# Patient Record
Sex: MALE | Race: WHITE | NOT HISPANIC OR LATINO | URBAN - METROPOLITAN AREA
[De-identification: names, ages, dates, MRNs, and addresses within clinical notes are randomized per-mention and may not be internally consistent; named-entity substitution may affect disease eponyms.]

---

## 2021-03-22 ENCOUNTER — TELEPHONE ENCOUNTER (OUTPATIENT)
Dept: URBAN - METROPOLITAN AREA CLINIC 68 | Facility: CLINIC | Age: 71
End: 2021-03-22

## 2021-03-22 ENCOUNTER — OFFICE VISIT (OUTPATIENT)
Dept: URBAN - METROPOLITAN AREA CLINIC 66 | Facility: CLINIC | Age: 71
End: 2021-03-22

## 2021-03-22 ENCOUNTER — OFFICE VISIT (OUTPATIENT)
Dept: URBAN - METROPOLITAN AREA CLINIC 68 | Facility: CLINIC | Age: 71
End: 2021-03-22

## 2021-03-29 ENCOUNTER — OFFICE VISIT (OUTPATIENT)
Dept: URBAN - METROPOLITAN AREA CLINIC 66 | Facility: CLINIC | Age: 71
End: 2021-03-29

## 2021-06-22 ENCOUNTER — OFFICE VISIT (OUTPATIENT)
Dept: URBAN - METROPOLITAN AREA CLINIC 68 | Facility: CLINIC | Age: 71
End: 2021-06-22

## 2021-06-27 ENCOUNTER — OFFICE VISIT (OUTPATIENT)
Dept: URBAN - METROPOLITAN AREA CLINIC 68 | Facility: CLINIC | Age: 71
End: 2021-06-27

## 2021-06-28 ENCOUNTER — TELEPHONE ENCOUNTER (OUTPATIENT)
Dept: URBAN - METROPOLITAN AREA CLINIC 68 | Facility: CLINIC | Age: 71
End: 2021-06-28

## 2021-07-21 ENCOUNTER — OFFICE VISIT (OUTPATIENT)
Dept: URBAN - METROPOLITAN AREA SURGERY CENTER 12 | Facility: SURGERY CENTER | Age: 71
End: 2021-07-21

## 2021-07-22 ENCOUNTER — OFFICE VISIT (OUTPATIENT)
Dept: URBAN - METROPOLITAN AREA SURGERY CENTER 12 | Facility: SURGERY CENTER | Age: 71
End: 2021-07-22

## 2021-07-23 ENCOUNTER — LAB OUTSIDE AN ENCOUNTER (OUTPATIENT)
Dept: URBAN - METROPOLITAN AREA CLINIC 68 | Facility: CLINIC | Age: 71
End: 2021-07-23

## 2021-07-23 LAB — 01: (no result)

## 2021-07-26 ENCOUNTER — TELEPHONE ENCOUNTER (OUTPATIENT)
Dept: URBAN - METROPOLITAN AREA CLINIC 68 | Facility: CLINIC | Age: 71
End: 2021-07-26

## 2021-08-09 ENCOUNTER — OFFICE VISIT (OUTPATIENT)
Dept: URBAN - METROPOLITAN AREA CLINIC 66 | Facility: CLINIC | Age: 71
End: 2021-08-09

## 2021-08-09 ENCOUNTER — OFFICE VISIT (OUTPATIENT)
Dept: URBAN - METROPOLITAN AREA CLINIC 68 | Facility: CLINIC | Age: 71
End: 2021-08-09

## 2021-08-10 ENCOUNTER — TELEPHONE ENCOUNTER (OUTPATIENT)
Dept: URBAN - METROPOLITAN AREA CLINIC 68 | Facility: CLINIC | Age: 71
End: 2021-08-10

## 2022-06-04 ENCOUNTER — TELEPHONE ENCOUNTER (OUTPATIENT)
Dept: URBAN - METROPOLITAN AREA CLINIC 68 | Facility: CLINIC | Age: 72
End: 2022-06-04

## 2022-06-04 RX ORDER — DIPHENOXYLATE HCL/ATROPINE 2.5-.025MG
LOMOTIL( 2.5-0.025MG ORAL  AS NEEDED ) INACTIVE -HX ENTRY TABLET ORAL AS NEEDED
OUTPATIENT
Start: 2021-06-22

## 2022-06-04 RX ORDER — DIGOXIN 125 UG/1
DIGOXIN( 125MCG ORAL  DAILY ) INACTIVE -HX ENTRY TABLET ORAL DAILY
OUTPATIENT
Start: 2021-06-22

## 2022-06-04 RX ORDER — SODIUM SULFATE, MAGNESIUM SULFATE, AND POTASSIUM CHLORIDE 17.75; 2.7; 2.25 G/1; G/1; G/1
TABLET ORAL ONCE
Qty: 1 | Refills: 0 | OUTPATIENT
Start: 2021-06-22 | End: 2021-06-23

## 2022-06-04 RX ORDER — AMLODIPINE BESYLATE 10 MG/1
NORVASC( 10MG ORAL 1/2 TAB DAILY ) INACTIVE -HX ENTRY TABLET ORAL DAILY
OUTPATIENT
Start: 2021-06-22

## 2022-06-04 RX ORDER — INSULIN LISPRO 100 [IU]/ML
HUMALOG( 100UNIT/ML SUBCUTANEOUS  TWO TIMES DAILY, AS NEEDED ) INACTIVE -HX ENTRY INJECTION, SOLUTION INTRAVENOUS; SUBCUTANEOUS
OUTPATIENT
Start: 2021-06-22

## 2022-06-04 RX ORDER — RAMIPRIL 10 MG/1
ALTACE( 10MG ORAL  DAILY ) INACTIVE -HX ENTRY CAPSULE ORAL DAILY
OUTPATIENT
Start: 2021-06-22

## 2022-06-04 RX ORDER — FINASTERIDE 5 MG/1
PROSCAR( 5MG ORAL  TWO TIMES DAILY ) INACTIVE -HX ENTRY TABLET, FILM COATED ORAL
OUTPATIENT
Start: 2019-11-04

## 2022-06-04 RX ORDER — DAPAGLIFLOZIN 10 MG/1
FARXIGA( 10MG ORAL  DAILY ) INACTIVE -HX ENTRY TABLET, FILM COATED ORAL DAILY
OUTPATIENT
Start: 2021-06-22

## 2022-06-04 RX ORDER — RAMIPRIL 10 MG/1
RAMIPRIL( 10MG ORAL  BID ) INACTIVE -HX ENTRY CAPSULE ORAL BID
OUTPATIENT
Start: 2021-06-22

## 2022-06-04 RX ORDER — FAMOTIDINE 20 MG/1
TABLET, FILM COATED ORAL
Qty: 180 | Refills: 0 | OUTPATIENT
Start: 2019-10-07 | End: 2020-01-05

## 2022-06-04 RX ORDER — PANTOPRAZOLE SODIUM 40 MG/1
TABLET, DELAYED RELEASE ORAL
Qty: 180 | Refills: 180 | OUTPATIENT
Start: 2021-03-29 | End: 2021-06-22

## 2022-06-05 ENCOUNTER — TELEPHONE ENCOUNTER (OUTPATIENT)
Dept: URBAN - METROPOLITAN AREA CLINIC 68 | Facility: CLINIC | Age: 72
End: 2022-06-05

## 2022-06-05 RX ORDER — HYDRALAZINE HYDROCHLORIDE 25 MG/1
HYDRALAZINE HCL( 25MG ORAL   ) ACTIVE -HX ENTRY TABLET ORAL
Status: ACTIVE | COMMUNITY
Start: 2021-08-09

## 2022-06-05 RX ORDER — ZOLPIDEM TARTRATE 10 MG/1
AMBIEN( 10MG ORAL   ) ACTIVE -HX ENTRY TABLET, FILM COATED ORAL
Status: ACTIVE | COMMUNITY
Start: 2021-08-09

## 2022-06-05 RX ORDER — MONTELUKAST SODIUM 10 MG/1
SINGULAIR( 10MG ORAL   ) ACTIVE -HX ENTRY TABLET, FILM COATED ORAL
Status: ACTIVE | COMMUNITY
Start: 2021-08-09

## 2022-06-05 RX ORDER — DIGOXIN 0.05 MG/ML
DIGOXIN( 0.05MG/ML ORAL   ) ACTIVE -HX ENTRY SOLUTION ORAL
Status: ACTIVE | COMMUNITY
Start: 2021-08-09

## 2022-06-05 RX ORDER — LEVOCETIRIZINE DIHYDROCHLORIDE 5 MG/1
LEVOCETIRIZINE DIHYDROCHLORIDE( 5MG ORAL   ) ACTIVE -HX ENTRY TABLET, FILM COATED ORAL
Status: ACTIVE | COMMUNITY
Start: 2021-08-09

## 2022-06-05 RX ORDER — LIRAGLUTIDE 6 MG/ML
VICTOZA( 18MG/3ML SUBCUTANEOUS   ) ACTIVE -HX ENTRY INJECTION SUBCUTANEOUS
Status: ACTIVE | COMMUNITY
Start: 2021-08-09

## 2022-06-05 RX ORDER — TAMSULOSIN HYDROCHLORIDE 0.4 MG/1
FLOMAX( 0.4MG ORAL  TWO TIMES DAILY ) ACTIVE -HX ENTRY CAPSULE ORAL
Status: ACTIVE | COMMUNITY
Start: 2021-08-09

## 2022-06-05 RX ORDER — TORSEMIDE 20 MG/1
TORSEMIDE( 20MG ORAL   ) ACTIVE -HX ENTRY TABLET ORAL
Status: ACTIVE | COMMUNITY
Start: 2021-08-09

## 2022-06-05 RX ORDER — PRAVASTATIN SODIUM 40 MG/1
PRAVASTATIN SODIUM( 40MG ORAL  DAILY ) ACTIVE -HX ENTRY TABLET ORAL DAILY
Status: ACTIVE | COMMUNITY
Start: 2021-08-09

## 2022-06-05 RX ORDER — VALSARTAN 40 MG/1
VALSARTAN( 40MG ORAL   ) ACTIVE -HX ENTRY TABLET ORAL
Status: ACTIVE | COMMUNITY
Start: 2021-08-09

## 2022-06-05 RX ORDER — FLUTICASONE PROPIONATE AND SALMETEROL XINAFOATE 45; 21 UG/1; UG/1
ADVAIR HFA( 45-21MCG/ACT INHALATION   ) ACTIVE -HX ENTRY AEROSOL, METERED RESPIRATORY (INHALATION)
Status: ACTIVE | COMMUNITY
Start: 2021-08-09

## 2022-06-05 RX ORDER — ELECTROLYTES/DEXTROSE
MULTIVITAMIN ADULT(  ORAL  DAILY ) ACTIVE -HX ENTRY SOLUTION, ORAL ORAL DAILY
Status: ACTIVE | COMMUNITY
Start: 2021-08-09

## 2022-06-05 RX ORDER — INSULIN ASPART 100 [IU]/ML
NOVOLOG FLEXPEN( 100UNIT/ML SUBCUTANEOUS   ) ACTIVE -HX ENTRY INJECTION, SOLUTION INTRAVENOUS; SUBCUTANEOUS
Status: ACTIVE | COMMUNITY
Start: 2021-08-09

## 2022-06-05 RX ORDER — OXYCODONE HYDROCHLORIDE 40 MG/1
OXYCONTIN( 40MG ORAL   ) ACTIVE -HX ENTRY TABLET, FILM COATED, EXTENDED RELEASE ORAL
Status: ACTIVE | COMMUNITY
Start: 2021-08-09

## 2022-06-05 RX ORDER — INSULIN GLARGINE 100 [IU]/ML
LANTUS SOLOSTAR( 100UNIT/ML SUBCUTANEOUS 45 UNITS  ) ACTIVE -HX ENTRY INJECTION, SOLUTION SUBCUTANEOUS
Status: ACTIVE | COMMUNITY
Start: 2021-08-09

## 2022-06-05 RX ORDER — CARVEDILOL 25 MG/1
COREG( 25MG ORAL  TWO TIMES DAILY ) ACTIVE -HX ENTRY TABLET, FILM COATED ORAL
Status: ACTIVE | COMMUNITY
Start: 2021-08-09

## 2022-06-05 RX ORDER — HYDROCODONE/ACETAMINOPHEN 10MG-325MG
NORCO( 10-325MG ORAL   ) ACTIVE -HX ENTRY TABLET ORAL
Status: ACTIVE | COMMUNITY
Start: 2021-08-09

## 2022-06-05 RX ORDER — TRAZODONE HYDROCHLORIDE 150 MG/1
TRAZODONE HCL( 150MG ORAL   ) ACTIVE -HX ENTRY TABLET ORAL
Status: ACTIVE | COMMUNITY
Start: 2021-08-09

## 2022-06-05 RX ORDER — MONTELUKAST SODIUM 10 MG/1
MONTELUKAST SODIUM( 10MG ORAL   ) ACTIVE -HX ENTRY TABLET, FILM COATED ORAL
Status: ACTIVE | COMMUNITY
Start: 2021-08-09

## 2022-06-05 RX ORDER — PANTOPRAZOLE SODIUM 40 MG/1
PROTONIX( 40MG ORAL   ) ACTIVE -HX ENTRY TABLET, DELAYED RELEASE ORAL
Status: ACTIVE | COMMUNITY
Start: 2021-08-09

## 2022-06-25 ENCOUNTER — TELEPHONE ENCOUNTER (OUTPATIENT)
Age: 72
End: 2022-06-25

## 2022-06-25 RX ORDER — DAPAGLIFLOZIN 10 MG/1
FARXIGA( 10MG ORAL  DAILY ) INACTIVE -HX ENTRY TABLET, FILM COATED ORAL DAILY
OUTPATIENT
Start: 2021-06-22

## 2022-06-25 RX ORDER — PANTOPRAZOLE 40 MG/1
TABLET, DELAYED RELEASE ORAL
Qty: 180 | Refills: 180 | OUTPATIENT
Start: 2021-03-29 | End: 2021-06-22

## 2022-06-25 RX ORDER — SODIUM SULFATE, MAGNESIUM SULFATE, AND POTASSIUM CHLORIDE 17.75; 2.7; 2.25 G/1; G/1; G/1
TABLET ORAL ONCE
Qty: 1 | Refills: 0 | OUTPATIENT
Start: 2021-06-22 | End: 2021-06-23

## 2022-06-25 RX ORDER — RAMIPRIL 10 MG/1
RAMIPRIL( 10MG ORAL  BID ) INACTIVE -HX ENTRY CAPSULE ORAL BID
OUTPATIENT
Start: 2021-06-22

## 2022-06-25 RX ORDER — SODIUM SULFATE, POTASSIUM SULFATE, MAGNESIUM SULFATE 17.5; 3.13; 1.6 G/ML; G/ML; G/ML
SOLUTION, CONCENTRATE ORAL AS DIRECTED
Qty: 1 | Refills: 0 | OUTPATIENT
Start: 2017-06-12 | End: 2017-06-13

## 2022-06-25 RX ORDER — FINASTERIDE 5 MG/1
PROSCAR( 5MG ORAL  TWO TIMES DAILY ) INACTIVE -HX ENTRY TABLET, FILM COATED ORAL
OUTPATIENT
Start: 2019-11-04

## 2022-06-25 RX ORDER — DIGOXIN 0.12 MG/1
DIGOXIN( 125MCG ORAL  DAILY ) INACTIVE -HX ENTRY TABLET ORAL DAILY
OUTPATIENT
Start: 2021-06-22

## 2022-06-25 RX ORDER — DIPHENOXYLATE HCL/ATROPINE 2.5-.025MG
LOMOTIL( 2.5-0.025MG ORAL  AS NEEDED ) INACTIVE -HX ENTRY TABLET ORAL AS NEEDED
OUTPATIENT
Start: 2021-06-22

## 2022-06-25 RX ORDER — INSULIN LISPRO 100 [IU]/ML
HUMALOG( 100UNIT/ML SUBCUTANEOUS  TWO TIMES DAILY, AS NEEDED ) INACTIVE -HX ENTRY INJECTION, SOLUTION INTRAVENOUS; SUBCUTANEOUS
OUTPATIENT
Start: 2021-06-22

## 2022-06-25 RX ORDER — RAMIPRIL 10 MG/1
ALTACE( 10MG ORAL  DAILY ) INACTIVE -HX ENTRY CAPSULE ORAL DAILY
OUTPATIENT
Start: 2021-06-22

## 2022-06-26 ENCOUNTER — TELEPHONE ENCOUNTER (OUTPATIENT)
Age: 72
End: 2022-06-26

## 2022-06-26 RX ORDER — HYDRALAZINE HYDROCHLORIDE 25 MG/1
HYDRALAZINE HCL( 25MG ORAL   ) ACTIVE -HX ENTRY TABLET ORAL
Status: ACTIVE | COMMUNITY
Start: 2021-08-09

## 2022-06-26 RX ORDER — VALSARTAN 40 MG/1
VALSARTAN( 40MG ORAL   ) ACTIVE -HX ENTRY TABLET, COATED ORAL
Status: ACTIVE | COMMUNITY
Start: 2021-08-09

## 2022-06-26 RX ORDER — ALBUTEROL SULFATE 90 UG/1
ALBUTEROL( 90MCG/ACT INHALATION   ) ACTIVE -HX ENTRY INHALANT RESPIRATORY (INHALATION)
Status: ACTIVE | COMMUNITY
Start: 2021-08-09

## 2022-06-26 RX ORDER — POTASSIUM CHLORIDE 750 MG/1
POTASSIUM( 75MG ORAL   ) ACTIVE -HX ENTRY TABLET, EXTENDED RELEASE ORAL
Status: ACTIVE | COMMUNITY
Start: 2021-08-09

## 2022-06-26 RX ORDER — CHOLECALCIFEROL (VITAMIN D3) 25 MCG
D3(   5000UNITS DAILY ) ACTIVE -HX ENTRY TABLET,CHEWABLE ORAL DAILY
Status: ACTIVE | COMMUNITY
Start: 2021-08-09

## 2022-06-26 RX ORDER — TAMSULOSIN HCL 0.4 MG
FLOMAX( 0.4MG ORAL  TWO TIMES DAILY ) ACTIVE -HX ENTRY CAPSULE ORAL
Status: ACTIVE | COMMUNITY
Start: 2021-08-09

## 2022-06-26 RX ORDER — MONTELUKAST SODIUM 10 MG/1
SINGULAIR( 10MG ORAL   ) ACTIVE -HX ENTRY TABLET, FILM COATED ORAL
Status: ACTIVE | COMMUNITY
Start: 2021-08-09

## 2022-06-26 RX ORDER — INSULIN GLARGINE 100 [IU]/ML
LANTUS SOLOSTAR( 100UNIT/ML SUBCUTANEOUS 45 UNITS  ) ACTIVE -HX ENTRY INJECTION, SOLUTION SUBCUTANEOUS
Status: ACTIVE | COMMUNITY
Start: 2021-08-09

## 2022-06-26 RX ORDER — INSULIN ASPART 100 [IU]/ML
NOVOLOG FLEXPEN( 100UNIT/ML SUBCUTANEOUS   ) ACTIVE -HX ENTRY INJECTION, SOLUTION INTRAVENOUS; SUBCUTANEOUS
Status: ACTIVE | COMMUNITY
Start: 2021-08-09

## 2022-06-26 RX ORDER — ZOLPIDEM TARTRATE 10 MG
AMBIEN( 10MG ORAL   ) ACTIVE -HX ENTRY TABLET ORAL
Status: ACTIVE | COMMUNITY
Start: 2021-08-09

## 2022-06-26 RX ORDER — MONTELUKAST 10 MG/1
MONTELUKAST SODIUM( 10MG ORAL   ) ACTIVE -HX ENTRY TABLET, FILM COATED ORAL
Status: ACTIVE | COMMUNITY
Start: 2021-08-09

## 2022-06-26 RX ORDER — FLUTICASONE FUROATE AND VILANTEROL 100; 25 UG/1; UG/1
FLUTICASONE FUROATE( 27.5MCG/SPRAY NASAL   ) ACTIVE -HX ENTRY POWDER RESPIRATORY (INHALATION)
Status: ACTIVE | COMMUNITY
Start: 2021-08-09

## 2022-06-26 RX ORDER — TORSEMIDE 20 MG/1
TORSEMIDE( 20MG ORAL   ) ACTIVE -HX ENTRY TABLET ORAL
Status: ACTIVE | COMMUNITY
Start: 2021-08-09

## 2022-06-26 RX ORDER — PRAVASTATIN SODIUM 40 MG/1
PRAVASTATIN SODIUM( 40MG ORAL  DAILY ) ACTIVE -HX ENTRY TABLET ORAL DAILY
Status: ACTIVE | COMMUNITY
Start: 2021-08-09

## 2022-06-26 RX ORDER — TAMSULOSIN HYDROCHLORIDE 0.4 MG/1
TAMSULOSIN HCL( 0.4MG ORAL   ) ACTIVE -HX ENTRY CAPSULE ORAL
Status: ACTIVE | COMMUNITY
Start: 2021-08-09

## 2022-06-26 RX ORDER — CARVEDILOL 25 MG/1
COREG( 25MG ORAL  TWO TIMES DAILY ) ACTIVE -HX ENTRY TABLET, FILM COATED ORAL
Status: ACTIVE | COMMUNITY
Start: 2021-08-09

## 2022-06-26 RX ORDER — OXYCODONE HYDROCHLORIDE 40 MG/1
OXYCONTIN( 40MG ORAL   ) ACTIVE -HX ENTRY TABLET, FILM COATED, EXTENDED RELEASE ORAL
Status: ACTIVE | COMMUNITY
Start: 2021-08-09

## 2022-06-26 RX ORDER — DIGOXIN 0.05 MG/ML
DIGOXIN( 0.05MG/ML ORAL   ) ACTIVE -HX ENTRY SOLUTION ORAL
Status: ACTIVE | COMMUNITY
Start: 2021-08-09

## 2022-06-26 RX ORDER — FLUTICASONE PROPIONATE AND SALMETEROL XINAFOATE 45; 21 UG/1; UG/1
ADVAIR HFA( 45-21MCG/ACT INHALATION   ) ACTIVE -HX ENTRY AEROSOL, METERED RESPIRATORY (INHALATION)
Status: ACTIVE | COMMUNITY
Start: 2021-08-09

## 2022-06-26 RX ORDER — FAMOTIDINE 20 MG/1
TABLET ORAL
Qty: 180 | Refills: 0 | Status: ACTIVE | COMMUNITY
Start: 2022-06-17

## 2022-06-26 RX ORDER — TRIAMCINOLONE ACETONIDE 55 UG/1
NASACORT( 55MCG/ACT NASAL   ) ACTIVE -HX ENTRY SPRAY, METERED NASAL
Status: ACTIVE | COMMUNITY
Start: 2021-08-09

## 2022-06-26 RX ORDER — LIRAGLUTIDE 6 MG/ML
VICTOZA( 18MG/3ML SUBCUTANEOUS   ) ACTIVE -HX ENTRY INJECTION SUBCUTANEOUS
Status: ACTIVE | COMMUNITY
Start: 2021-08-09

## 2022-06-26 RX ORDER — LEVOCETIRIZINE DIHYDROCHLORIDE 5 MG/1
LEVOCETIRIZINE DIHYDROCHLORIDE( 5MG ORAL   ) ACTIVE -HX ENTRY TABLET ORAL
Status: ACTIVE | COMMUNITY
Start: 2021-08-09

## 2022-06-26 RX ORDER — TRAZODONE HYDROCHLORIDE 150 MG/1
TRAZODONE HCL( 150MG ORAL   ) ACTIVE -HX ENTRY TABLET ORAL
Status: ACTIVE | COMMUNITY
Start: 2021-08-09

## 2022-06-26 RX ORDER — ELECTROLYTES/DEXTROSE
MULTIVITAMIN ADULT(  ORAL  DAILY ) ACTIVE -HX ENTRY SOLUTION, ORAL ORAL DAILY
Status: ACTIVE | COMMUNITY
Start: 2021-08-09

## 2022-07-25 ENCOUNTER — ERX REFILL RESPONSE (OUTPATIENT)
Dept: URBAN - METROPOLITAN AREA CLINIC 66 | Facility: CLINIC | Age: 72
End: 2022-07-25

## 2022-07-25 RX ORDER — PANTOPRAZOLE SODIUM 40 MG/1
TAKE ONE TABLET BY MOUTH EVERY 12 HOURS TABLET, DELAYED RELEASE ORAL
Qty: 180 TABLET | Refills: 4 | OUTPATIENT

## 2022-07-25 RX ORDER — PANTOPRAZOLE SODIUM 40 MG/1
TAKE ONE TABLET BY MOUTH EVERY 12 HOURS TABLET, DELAYED RELEASE ORAL
Qty: 180 TABLET | Refills: 3 | OUTPATIENT

## 2022-09-28 ENCOUNTER — OFFICE VISIT (OUTPATIENT)
Dept: URBAN - METROPOLITAN AREA CLINIC 68 | Facility: CLINIC | Age: 72
End: 2022-09-28

## 2022-10-03 ENCOUNTER — TELEPHONE ENCOUNTER (OUTPATIENT)
Dept: URBAN - METROPOLITAN AREA CLINIC 68 | Facility: CLINIC | Age: 72
End: 2022-10-03

## 2022-10-03 ENCOUNTER — OFFICE VISIT (OUTPATIENT)
Dept: URBAN - METROPOLITAN AREA CLINIC 66 | Facility: CLINIC | Age: 72
End: 2022-10-03

## 2022-10-03 RX ORDER — CARVEDILOL 25 MG/1
COREG( 25MG ORAL  TWO TIMES DAILY ) ACTIVE -HX ENTRY TABLET, FILM COATED ORAL
Status: ACTIVE | COMMUNITY
Start: 2021-08-09

## 2022-10-03 RX ORDER — DIGOXIN 0.05 MG/ML
DIGOXIN( 0.05MG/ML ORAL   ) ACTIVE -HX ENTRY SOLUTION ORAL
Status: ACTIVE | COMMUNITY
Start: 2021-08-09

## 2022-10-03 RX ORDER — PANTOPRAZOLE SODIUM 40 MG/1
TAKE ONE TABLET BY MOUTH EVERY 12 HOURS TABLET, DELAYED RELEASE ORAL
Qty: 180 TABLET | Refills: 3 | Status: ACTIVE | COMMUNITY

## 2022-10-03 RX ORDER — ZOLPIDEM TARTRATE 10 MG/1
AMBIEN( 10MG ORAL   ) ACTIVE -HX ENTRY TABLET, FILM COATED ORAL
Status: ACTIVE | COMMUNITY
Start: 2021-08-09

## 2022-10-03 RX ORDER — ELECTROLYTES/DEXTROSE
MULTIVITAMIN ADULT(  ORAL  DAILY ) ACTIVE -HX ENTRY SOLUTION, ORAL ORAL DAILY
Status: ACTIVE | COMMUNITY
Start: 2021-08-09

## 2022-10-03 RX ORDER — INSULIN GLARGINE 100 [IU]/ML
LANTUS SOLOSTAR( 100UNIT/ML SUBCUTANEOUS 45 UNITS  ) ACTIVE -HX ENTRY INJECTION, SOLUTION SUBCUTANEOUS
Status: ACTIVE | COMMUNITY
Start: 2021-08-09

## 2022-10-03 RX ORDER — LIRAGLUTIDE 6 MG/ML
VICTOZA( 18MG/3ML SUBCUTANEOUS   ) ACTIVE -HX ENTRY INJECTION SUBCUTANEOUS
Status: ACTIVE | COMMUNITY
Start: 2021-08-09

## 2022-10-03 RX ORDER — VALSARTAN 40 MG/1
VALSARTAN( 40MG ORAL   ) ACTIVE -HX ENTRY TABLET ORAL
Status: ACTIVE | COMMUNITY
Start: 2021-08-09

## 2022-10-03 RX ORDER — TRAZODONE HYDROCHLORIDE 150 MG/1
TRAZODONE HCL( 150MG ORAL   ) ACTIVE -HX ENTRY TABLET ORAL
Status: ACTIVE | COMMUNITY
Start: 2021-08-09

## 2022-10-03 RX ORDER — TORSEMIDE 20 MG/1
TORSEMIDE( 20MG ORAL   ) ACTIVE -HX ENTRY TABLET ORAL
Status: ACTIVE | COMMUNITY
Start: 2021-08-09

## 2022-10-03 RX ORDER — FLUTICASONE PROPIONATE AND SALMETEROL XINAFOATE 45; 21 UG/1; UG/1
ADVAIR HFA( 45-21MCG/ACT INHALATION   ) ACTIVE -HX ENTRY AEROSOL, METERED RESPIRATORY (INHALATION)
Status: ACTIVE | COMMUNITY
Start: 2021-08-09

## 2022-10-03 RX ORDER — MONTELUKAST SODIUM 10 MG/1
SINGULAIR( 10MG ORAL   ) ACTIVE -HX ENTRY TABLET, FILM COATED ORAL
Status: ACTIVE | COMMUNITY
Start: 2021-08-09

## 2022-10-03 RX ORDER — OXYCODONE HYDROCHLORIDE 40 MG/1
OXYCONTIN( 40MG ORAL   ) ACTIVE -HX ENTRY TABLET, FILM COATED, EXTENDED RELEASE ORAL
Status: ACTIVE | COMMUNITY
Start: 2021-08-09

## 2022-10-03 RX ORDER — TAMSULOSIN HYDROCHLORIDE 0.4 MG/1
FLOMAX( 0.4MG ORAL  TWO TIMES DAILY ) ACTIVE -HX ENTRY CAPSULE ORAL
Status: ACTIVE | COMMUNITY
Start: 2021-08-09

## 2022-10-03 RX ORDER — MONTELUKAST SODIUM 10 MG/1
MONTELUKAST SODIUM( 10MG ORAL   ) ACTIVE -HX ENTRY TABLET, FILM COATED ORAL
Status: ACTIVE | COMMUNITY
Start: 2021-08-09

## 2022-10-03 RX ORDER — LEVOCETIRIZINE DIHYDROCHLORIDE 5 MG/1
LEVOCETIRIZINE DIHYDROCHLORIDE( 5MG ORAL   ) ACTIVE -HX ENTRY TABLET, FILM COATED ORAL
Status: ACTIVE | COMMUNITY
Start: 2021-08-09

## 2022-10-03 RX ORDER — PRAVASTATIN SODIUM 40 MG/1
PRAVASTATIN SODIUM( 40MG ORAL  DAILY ) ACTIVE -HX ENTRY TABLET ORAL DAILY
Status: ACTIVE | COMMUNITY
Start: 2021-08-09

## 2022-10-03 RX ORDER — HYDRALAZINE HYDROCHLORIDE 25 MG/1
HYDRALAZINE HCL( 25MG ORAL   ) ACTIVE -HX ENTRY TABLET ORAL
Status: ACTIVE | COMMUNITY
Start: 2021-08-09

## 2022-10-03 RX ORDER — PANTOPRAZOLE SODIUM 40 MG/1
PROTONIX( 40MG ORAL   ) ACTIVE -HX ENTRY TABLET, DELAYED RELEASE ORAL
Status: ACTIVE | COMMUNITY
Start: 2021-08-09

## 2022-10-03 RX ORDER — HYDROCODONE/ACETAMINOPHEN 10MG-325MG
NORCO( 10-325MG ORAL   ) ACTIVE -HX ENTRY TABLET ORAL
Status: ACTIVE | COMMUNITY
Start: 2021-08-09

## 2022-10-03 RX ORDER — INSULIN ASPART 100 [IU]/ML
NOVOLOG FLEXPEN( 100UNIT/ML SUBCUTANEOUS   ) ACTIVE -HX ENTRY INJECTION, SOLUTION INTRAVENOUS; SUBCUTANEOUS
Status: ACTIVE | COMMUNITY
Start: 2021-08-09

## 2022-10-03 NOTE — HPI-MIGRATED HPI
Transition of Care : Patient evaluated due to dysphagia.  Patient consented for video-audio encounter using [employer] Complaint deshawn. Denies nausea, vomits, odynophagia, heartburn, abdominal pain, diarrhea, constipation GI bleeding

## 2022-10-25 ENCOUNTER — OFFICE VISIT (OUTPATIENT)
Dept: URBAN - METROPOLITAN AREA CLINIC 68 | Facility: CLINIC | Age: 72
End: 2022-10-25

## 2022-10-25 RX ORDER — PANTOPRAZOLE SODIUM 40 MG/1
PROTONIX( 40MG ORAL   ) ACTIVE -HX ENTRY TABLET, DELAYED RELEASE ORAL
Status: ACTIVE | COMMUNITY
Start: 2021-08-09

## 2022-10-25 RX ORDER — PANTOPRAZOLE SODIUM 40 MG/1
TAKE ONE TABLET BY MOUTH EVERY 12 HOURS TABLET, DELAYED RELEASE ORAL
Qty: 180 TABLET | Refills: 3 | Status: ACTIVE | COMMUNITY

## 2022-10-25 RX ORDER — TORSEMIDE 20 MG/1
TORSEMIDE( 20MG ORAL   ) ACTIVE -HX ENTRY TABLET ORAL
Status: ACTIVE | COMMUNITY
Start: 2021-08-09

## 2022-10-25 RX ORDER — ZOLPIDEM TARTRATE 10 MG/1
AMBIEN( 10MG ORAL   ) ACTIVE -HX ENTRY TABLET, FILM COATED ORAL
Status: ACTIVE | COMMUNITY
Start: 2021-08-09

## 2022-10-25 RX ORDER — TRAZODONE HYDROCHLORIDE 150 MG/1
TRAZODONE HCL( 150MG ORAL   ) ACTIVE -HX ENTRY TABLET ORAL
Status: ACTIVE | COMMUNITY
Start: 2021-08-09

## 2022-10-25 RX ORDER — MONTELUKAST SODIUM 10 MG/1
SINGULAIR( 10MG ORAL   ) ACTIVE -HX ENTRY TABLET, FILM COATED ORAL
Status: ACTIVE | COMMUNITY
Start: 2021-08-09

## 2022-10-25 RX ORDER — CARVEDILOL 25 MG/1
COREG( 25MG ORAL  TWO TIMES DAILY ) ACTIVE -HX ENTRY TABLET, FILM COATED ORAL
Status: ACTIVE | COMMUNITY
Start: 2021-08-09

## 2022-10-25 RX ORDER — INSULIN GLARGINE 100 [IU]/ML
LANTUS SOLOSTAR( 100UNIT/ML SUBCUTANEOUS 45 UNITS  ) ACTIVE -HX ENTRY INJECTION, SOLUTION SUBCUTANEOUS
Status: ACTIVE | COMMUNITY
Start: 2021-08-09

## 2022-10-25 RX ORDER — FLUTICASONE PROPIONATE AND SALMETEROL XINAFOATE 45; 21 UG/1; UG/1
ADVAIR HFA( 45-21MCG/ACT INHALATION   ) ACTIVE -HX ENTRY AEROSOL, METERED RESPIRATORY (INHALATION)
Status: ACTIVE | COMMUNITY
Start: 2021-08-09

## 2022-10-25 RX ORDER — HYDROCODONE/ACETAMINOPHEN 10MG-325MG
NORCO( 10-325MG ORAL   ) ACTIVE -HX ENTRY TABLET ORAL
Status: ACTIVE | COMMUNITY
Start: 2021-08-09

## 2022-10-25 RX ORDER — DIGOXIN 0.05 MG/ML
DIGOXIN( 0.05MG/ML ORAL   ) ACTIVE -HX ENTRY SOLUTION ORAL
Status: ACTIVE | COMMUNITY
Start: 2021-08-09

## 2022-10-25 RX ORDER — LEVOCETIRIZINE DIHYDROCHLORIDE 5 MG/1
LEVOCETIRIZINE DIHYDROCHLORIDE( 5MG ORAL   ) ACTIVE -HX ENTRY TABLET, FILM COATED ORAL
Status: ACTIVE | COMMUNITY
Start: 2021-08-09

## 2022-10-25 RX ORDER — OXYCODONE HYDROCHLORIDE 40 MG/1
OXYCONTIN( 40MG ORAL   ) ACTIVE -HX ENTRY TABLET, FILM COATED, EXTENDED RELEASE ORAL
Status: ACTIVE | COMMUNITY
Start: 2021-08-09

## 2022-10-25 RX ORDER — VALSARTAN 40 MG/1
VALSARTAN( 40MG ORAL   ) ACTIVE -HX ENTRY TABLET ORAL
Status: ACTIVE | COMMUNITY
Start: 2021-08-09

## 2022-10-25 RX ORDER — LIRAGLUTIDE 6 MG/ML
VICTOZA( 18MG/3ML SUBCUTANEOUS   ) ACTIVE -HX ENTRY INJECTION SUBCUTANEOUS
Status: ACTIVE | COMMUNITY
Start: 2021-08-09

## 2022-10-25 RX ORDER — PRAVASTATIN SODIUM 40 MG/1
PRAVASTATIN SODIUM( 40MG ORAL  DAILY ) ACTIVE -HX ENTRY TABLET ORAL DAILY
Status: ACTIVE | COMMUNITY
Start: 2021-08-09

## 2022-10-25 RX ORDER — MONTELUKAST SODIUM 10 MG/1
MONTELUKAST SODIUM( 10MG ORAL   ) ACTIVE -HX ENTRY TABLET, FILM COATED ORAL
Status: ACTIVE | COMMUNITY
Start: 2021-08-09

## 2022-10-25 RX ORDER — ELECTROLYTES/DEXTROSE
MULTIVITAMIN ADULT(  ORAL  DAILY ) ACTIVE -HX ENTRY SOLUTION, ORAL ORAL DAILY
Status: ACTIVE | COMMUNITY
Start: 2021-08-09

## 2022-10-25 RX ORDER — HYDRALAZINE HYDROCHLORIDE 25 MG/1
HYDRALAZINE HCL( 25MG ORAL   ) ACTIVE -HX ENTRY TABLET ORAL
Status: ACTIVE | COMMUNITY
Start: 2021-08-09

## 2022-10-25 RX ORDER — TAMSULOSIN HYDROCHLORIDE 0.4 MG/1
FLOMAX( 0.4MG ORAL  TWO TIMES DAILY ) ACTIVE -HX ENTRY CAPSULE ORAL
Status: ACTIVE | COMMUNITY
Start: 2021-08-09

## 2022-10-25 RX ORDER — INSULIN ASPART 100 [IU]/ML
NOVOLOG FLEXPEN( 100UNIT/ML SUBCUTANEOUS   ) ACTIVE -HX ENTRY INJECTION, SOLUTION INTRAVENOUS; SUBCUTANEOUS
Status: ACTIVE | COMMUNITY
Start: 2021-08-09

## 2022-10-30 ENCOUNTER — TELEPHONE ENCOUNTER (OUTPATIENT)
Dept: URBAN - METROPOLITAN AREA CLINIC 68 | Facility: CLINIC | Age: 72
End: 2022-10-30

## 2022-11-01 ENCOUNTER — TELEPHONE ENCOUNTER (OUTPATIENT)
Dept: URBAN - METROPOLITAN AREA CLINIC 68 | Facility: CLINIC | Age: 72
End: 2022-11-01

## 2022-11-01 ENCOUNTER — OFFICE VISIT (OUTPATIENT)
Dept: URBAN - METROPOLITAN AREA CLINIC 66 | Facility: CLINIC | Age: 72
End: 2022-11-01

## 2022-11-01 RX ORDER — CARVEDILOL 25 MG/1
COREG( 25MG ORAL  TWO TIMES DAILY ) ACTIVE -HX ENTRY TABLET, FILM COATED ORAL
Status: ACTIVE | COMMUNITY
Start: 2021-08-09

## 2022-11-01 RX ORDER — TAMSULOSIN HYDROCHLORIDE 0.4 MG/1
FLOMAX( 0.4MG ORAL  TWO TIMES DAILY ) ACTIVE -HX ENTRY CAPSULE ORAL
Status: ACTIVE | COMMUNITY
Start: 2021-08-09

## 2022-11-01 RX ORDER — TRAZODONE HYDROCHLORIDE 150 MG/1
TRAZODONE HCL( 150MG ORAL   ) ACTIVE -HX ENTRY TABLET ORAL
Status: ACTIVE | COMMUNITY
Start: 2021-08-09

## 2022-11-01 RX ORDER — OXYCODONE HYDROCHLORIDE 40 MG/1
OXYCONTIN( 40MG ORAL   ) ACTIVE -HX ENTRY TABLET, FILM COATED, EXTENDED RELEASE ORAL
Status: ACTIVE | COMMUNITY
Start: 2021-08-09

## 2022-11-01 RX ORDER — HYDROCODONE/ACETAMINOPHEN 10MG-325MG
NORCO( 10-325MG ORAL   ) ACTIVE -HX ENTRY TABLET ORAL
Status: ACTIVE | COMMUNITY
Start: 2021-08-09

## 2022-11-01 RX ORDER — INSULIN GLARGINE 100 [IU]/ML
LANTUS SOLOSTAR( 100UNIT/ML SUBCUTANEOUS 45 UNITS  ) ACTIVE -HX ENTRY INJECTION, SOLUTION SUBCUTANEOUS
Status: ACTIVE | COMMUNITY
Start: 2021-08-09

## 2022-11-01 RX ORDER — DIGOXIN 0.05 MG/ML
DIGOXIN( 0.05MG/ML ORAL   ) ACTIVE -HX ENTRY SOLUTION ORAL
Status: ACTIVE | COMMUNITY
Start: 2021-08-09

## 2022-11-01 RX ORDER — PANTOPRAZOLE SODIUM 40 MG/1
TAKE ONE TABLET BY MOUTH EVERY 12 HOURS TABLET, DELAYED RELEASE ORAL
Qty: 180 TABLET | Refills: 3 | Status: ACTIVE | COMMUNITY

## 2022-11-01 RX ORDER — MONTELUKAST SODIUM 10 MG/1
SINGULAIR( 10MG ORAL   ) ACTIVE -HX ENTRY TABLET, FILM COATED ORAL
Status: ACTIVE | COMMUNITY
Start: 2021-08-09

## 2022-11-01 RX ORDER — ELECTROLYTES/DEXTROSE
MULTIVITAMIN ADULT(  ORAL  DAILY ) ACTIVE -HX ENTRY SOLUTION, ORAL ORAL DAILY
Status: ACTIVE | COMMUNITY
Start: 2021-08-09

## 2022-11-01 RX ORDER — PRAVASTATIN SODIUM 40 MG/1
PRAVASTATIN SODIUM( 40MG ORAL  DAILY ) ACTIVE -HX ENTRY TABLET ORAL DAILY
Status: ACTIVE | COMMUNITY
Start: 2021-08-09

## 2022-11-01 RX ORDER — INSULIN ASPART 100 [IU]/ML
NOVOLOG FLEXPEN( 100UNIT/ML SUBCUTANEOUS   ) ACTIVE -HX ENTRY INJECTION, SOLUTION INTRAVENOUS; SUBCUTANEOUS
Status: ACTIVE | COMMUNITY
Start: 2021-08-09

## 2022-11-01 RX ORDER — PANTOPRAZOLE SODIUM 40 MG/1
PROTONIX( 40MG ORAL   ) ACTIVE -HX ENTRY TABLET, DELAYED RELEASE ORAL
Status: ACTIVE | COMMUNITY
Start: 2021-08-09

## 2022-11-01 RX ORDER — VALSARTAN 40 MG/1
VALSARTAN( 40MG ORAL   ) ACTIVE -HX ENTRY TABLET ORAL
Status: ACTIVE | COMMUNITY
Start: 2021-08-09

## 2022-11-01 RX ORDER — MONTELUKAST SODIUM 10 MG/1
MONTELUKAST SODIUM( 10MG ORAL   ) ACTIVE -HX ENTRY TABLET, FILM COATED ORAL
Status: ACTIVE | COMMUNITY
Start: 2021-08-09

## 2022-11-01 RX ORDER — HYDRALAZINE HYDROCHLORIDE 25 MG/1
HYDRALAZINE HCL( 25MG ORAL   ) ACTIVE -HX ENTRY TABLET ORAL
Status: ACTIVE | COMMUNITY
Start: 2021-08-09

## 2022-11-01 RX ORDER — LEVOCETIRIZINE DIHYDROCHLORIDE 5 MG/1
LEVOCETIRIZINE DIHYDROCHLORIDE( 5MG ORAL   ) ACTIVE -HX ENTRY TABLET, FILM COATED ORAL
Status: ACTIVE | COMMUNITY
Start: 2021-08-09

## 2022-11-01 RX ORDER — FLUTICASONE PROPIONATE AND SALMETEROL XINAFOATE 45; 21 UG/1; UG/1
ADVAIR HFA( 45-21MCG/ACT INHALATION   ) ACTIVE -HX ENTRY AEROSOL, METERED RESPIRATORY (INHALATION)
Status: ACTIVE | COMMUNITY
Start: 2021-08-09

## 2022-11-01 RX ORDER — ZOLPIDEM TARTRATE 10 MG/1
AMBIEN( 10MG ORAL   ) ACTIVE -HX ENTRY TABLET, FILM COATED ORAL
Status: ACTIVE | COMMUNITY
Start: 2021-08-09

## 2022-11-01 RX ORDER — LIRAGLUTIDE 6 MG/ML
VICTOZA( 18MG/3ML SUBCUTANEOUS   ) ACTIVE -HX ENTRY INJECTION SUBCUTANEOUS
Status: ACTIVE | COMMUNITY
Start: 2021-08-09

## 2022-11-01 RX ORDER — TORSEMIDE 20 MG/1
TORSEMIDE( 20MG ORAL   ) ACTIVE -HX ENTRY TABLET ORAL
Status: ACTIVE | COMMUNITY
Start: 2021-08-09

## 2022-11-01 NOTE — HPI-MIGRATED HPI
Transition of Care : Patient evaluated after having recent US reported with cirrhosis.  Patient consented for video-audio encounter using [employer] Complaint deshawn. Denies nausea, vomits, dysphagia, odynophagia, heartburn, abdominal pain, diarrhea, constipation GI bleeding or weight loss

## 2022-11-02 ENCOUNTER — OFFICE VISIT (OUTPATIENT)
Dept: URBAN - METROPOLITAN AREA CLINIC 68 | Facility: CLINIC | Age: 72
End: 2022-11-02

## 2022-11-03 ENCOUNTER — OFFICE VISIT (OUTPATIENT)
Dept: URBAN - METROPOLITAN AREA SURGERY CENTER 12 | Facility: SURGERY CENTER | Age: 72
End: 2022-11-03

## 2022-11-03 RX ORDER — TAMSULOSIN HYDROCHLORIDE 0.4 MG/1
FLOMAX( 0.4MG ORAL  TWO TIMES DAILY ) ACTIVE -HX ENTRY CAPSULE ORAL
Status: ACTIVE | COMMUNITY
Start: 2021-08-09

## 2022-11-03 RX ORDER — ZOLPIDEM TARTRATE 10 MG/1
AMBIEN( 10MG ORAL   ) ACTIVE -HX ENTRY TABLET, FILM COATED ORAL
Status: ACTIVE | COMMUNITY
Start: 2021-08-09

## 2022-11-03 RX ORDER — FLUTICASONE PROPIONATE AND SALMETEROL XINAFOATE 45; 21 UG/1; UG/1
ADVAIR HFA( 45-21MCG/ACT INHALATION   ) ACTIVE -HX ENTRY AEROSOL, METERED RESPIRATORY (INHALATION)
Status: ACTIVE | COMMUNITY
Start: 2021-08-09

## 2022-11-03 RX ORDER — PRAVASTATIN SODIUM 40 MG/1
PRAVASTATIN SODIUM( 40MG ORAL  DAILY ) ACTIVE -HX ENTRY TABLET ORAL DAILY
Status: ACTIVE | COMMUNITY
Start: 2021-08-09

## 2022-11-03 RX ORDER — LIRAGLUTIDE 6 MG/ML
VICTOZA( 18MG/3ML SUBCUTANEOUS   ) ACTIVE -HX ENTRY INJECTION SUBCUTANEOUS
Status: ACTIVE | COMMUNITY
Start: 2021-08-09

## 2022-11-03 RX ORDER — HYDROCODONE/ACETAMINOPHEN 10MG-325MG
NORCO( 10-325MG ORAL   ) ACTIVE -HX ENTRY TABLET ORAL
Status: ACTIVE | COMMUNITY
Start: 2021-08-09

## 2022-11-03 RX ORDER — OXYCODONE HYDROCHLORIDE 40 MG/1
OXYCONTIN( 40MG ORAL   ) ACTIVE -HX ENTRY TABLET, FILM COATED, EXTENDED RELEASE ORAL
Status: ACTIVE | COMMUNITY
Start: 2021-08-09

## 2022-11-03 RX ORDER — CARVEDILOL 25 MG/1
COREG( 25MG ORAL  TWO TIMES DAILY ) ACTIVE -HX ENTRY TABLET, FILM COATED ORAL
Status: ACTIVE | COMMUNITY
Start: 2021-08-09

## 2022-11-03 RX ORDER — HYDRALAZINE HYDROCHLORIDE 25 MG/1
HYDRALAZINE HCL( 25MG ORAL   ) ACTIVE -HX ENTRY TABLET ORAL
Status: ACTIVE | COMMUNITY
Start: 2021-08-09

## 2022-11-03 RX ORDER — PANTOPRAZOLE SODIUM 40 MG/1
PROTONIX( 40MG ORAL   ) ACTIVE -HX ENTRY TABLET, DELAYED RELEASE ORAL
Status: ACTIVE | COMMUNITY
Start: 2021-08-09

## 2022-11-03 RX ORDER — INSULIN ASPART 100 [IU]/ML
NOVOLOG FLEXPEN( 100UNIT/ML SUBCUTANEOUS   ) ACTIVE -HX ENTRY INJECTION, SOLUTION INTRAVENOUS; SUBCUTANEOUS
Status: ACTIVE | COMMUNITY
Start: 2021-08-09

## 2022-11-03 RX ORDER — TRAZODONE HYDROCHLORIDE 150 MG/1
TRAZODONE HCL( 150MG ORAL   ) ACTIVE -HX ENTRY TABLET ORAL
Status: ACTIVE | COMMUNITY
Start: 2021-08-09

## 2022-11-03 RX ORDER — VALSARTAN 40 MG/1
VALSARTAN( 40MG ORAL   ) ACTIVE -HX ENTRY TABLET ORAL
Status: ACTIVE | COMMUNITY
Start: 2021-08-09

## 2022-11-03 RX ORDER — LEVOCETIRIZINE DIHYDROCHLORIDE 5 MG/1
LEVOCETIRIZINE DIHYDROCHLORIDE( 5MG ORAL   ) ACTIVE -HX ENTRY TABLET, FILM COATED ORAL
Status: ACTIVE | COMMUNITY
Start: 2021-08-09

## 2022-11-03 RX ORDER — ELECTROLYTES/DEXTROSE
MULTIVITAMIN ADULT(  ORAL  DAILY ) ACTIVE -HX ENTRY SOLUTION, ORAL ORAL DAILY
Status: ACTIVE | COMMUNITY
Start: 2021-08-09

## 2022-11-03 RX ORDER — DIGOXIN 0.05 MG/ML
DIGOXIN( 0.05MG/ML ORAL   ) ACTIVE -HX ENTRY SOLUTION ORAL
Status: ACTIVE | COMMUNITY
Start: 2021-08-09

## 2022-11-03 RX ORDER — PANTOPRAZOLE SODIUM 40 MG/1
TAKE ONE TABLET BY MOUTH EVERY 12 HOURS TABLET, DELAYED RELEASE ORAL
Qty: 180 TABLET | Refills: 3 | Status: ACTIVE | COMMUNITY

## 2022-11-03 RX ORDER — INSULIN GLARGINE 100 [IU]/ML
LANTUS SOLOSTAR( 100UNIT/ML SUBCUTANEOUS 45 UNITS  ) ACTIVE -HX ENTRY INJECTION, SOLUTION SUBCUTANEOUS
Status: ACTIVE | COMMUNITY
Start: 2021-08-09

## 2022-11-03 RX ORDER — TORSEMIDE 20 MG/1
TORSEMIDE( 20MG ORAL   ) ACTIVE -HX ENTRY TABLET ORAL
Status: ACTIVE | COMMUNITY
Start: 2021-08-09

## 2022-11-03 RX ORDER — MONTELUKAST SODIUM 10 MG/1
SINGULAIR( 10MG ORAL   ) ACTIVE -HX ENTRY TABLET, FILM COATED ORAL
Status: ACTIVE | COMMUNITY
Start: 2021-08-09

## 2022-11-03 RX ORDER — MONTELUKAST SODIUM 10 MG/1
MONTELUKAST SODIUM( 10MG ORAL   ) ACTIVE -HX ENTRY TABLET, FILM COATED ORAL
Status: ACTIVE | COMMUNITY
Start: 2021-08-09

## 2022-11-18 ENCOUNTER — OFFICE VISIT (OUTPATIENT)
Dept: URBAN - METROPOLITAN AREA CLINIC 68 | Facility: CLINIC | Age: 72
End: 2022-11-18

## 2022-11-18 RX ORDER — MONTELUKAST SODIUM 10 MG/1
SINGULAIR( 10MG ORAL   ) ACTIVE -HX ENTRY TABLET, FILM COATED ORAL
Status: ACTIVE | COMMUNITY
Start: 2021-08-09

## 2022-11-18 RX ORDER — PRAVASTATIN SODIUM 40 MG/1
PRAVASTATIN SODIUM( 40MG ORAL  DAILY ) ACTIVE -HX ENTRY TABLET ORAL DAILY
Status: ACTIVE | COMMUNITY
Start: 2021-08-09

## 2022-11-18 RX ORDER — ELECTROLYTES/DEXTROSE
MULTIVITAMIN ADULT(  ORAL  DAILY ) ACTIVE -HX ENTRY SOLUTION, ORAL ORAL DAILY
Status: ACTIVE | COMMUNITY
Start: 2021-08-09

## 2022-11-18 RX ORDER — VALSARTAN 40 MG/1
VALSARTAN( 40MG ORAL   ) ACTIVE -HX ENTRY TABLET ORAL
Status: ACTIVE | COMMUNITY
Start: 2021-08-09

## 2022-11-18 RX ORDER — OXYCODONE HYDROCHLORIDE 40 MG/1
OXYCONTIN( 40MG ORAL   ) ACTIVE -HX ENTRY TABLET, FILM COATED, EXTENDED RELEASE ORAL
Status: ACTIVE | COMMUNITY
Start: 2021-08-09

## 2022-11-18 RX ORDER — LEVOCETIRIZINE DIHYDROCHLORIDE 5 MG/1
LEVOCETIRIZINE DIHYDROCHLORIDE( 5MG ORAL   ) ACTIVE -HX ENTRY TABLET, FILM COATED ORAL
Status: ACTIVE | COMMUNITY
Start: 2021-08-09

## 2022-11-18 RX ORDER — LIRAGLUTIDE 6 MG/ML
VICTOZA( 18MG/3ML SUBCUTANEOUS   ) ACTIVE -HX ENTRY INJECTION SUBCUTANEOUS
Status: ACTIVE | COMMUNITY
Start: 2021-08-09

## 2022-11-18 RX ORDER — TRAZODONE HYDROCHLORIDE 150 MG/1
TRAZODONE HCL( 150MG ORAL   ) ACTIVE -HX ENTRY TABLET ORAL
Status: ACTIVE | COMMUNITY
Start: 2021-08-09

## 2022-11-18 RX ORDER — INSULIN ASPART 100 [IU]/ML
NOVOLOG FLEXPEN( 100UNIT/ML SUBCUTANEOUS   ) ACTIVE -HX ENTRY INJECTION, SOLUTION INTRAVENOUS; SUBCUTANEOUS
Status: ACTIVE | COMMUNITY
Start: 2021-08-09

## 2022-11-18 RX ORDER — MONTELUKAST SODIUM 10 MG/1
MONTELUKAST SODIUM( 10MG ORAL   ) ACTIVE -HX ENTRY TABLET, FILM COATED ORAL
Status: ACTIVE | COMMUNITY
Start: 2021-08-09

## 2022-11-18 RX ORDER — FLUTICASONE PROPIONATE AND SALMETEROL XINAFOATE 45; 21 UG/1; UG/1
ADVAIR HFA( 45-21MCG/ACT INHALATION   ) ACTIVE -HX ENTRY AEROSOL, METERED RESPIRATORY (INHALATION)
Status: ACTIVE | COMMUNITY
Start: 2021-08-09

## 2022-11-18 RX ORDER — CARVEDILOL 25 MG/1
COREG( 25MG ORAL  TWO TIMES DAILY ) ACTIVE -HX ENTRY TABLET, FILM COATED ORAL
Status: ACTIVE | COMMUNITY
Start: 2021-08-09

## 2022-11-18 RX ORDER — TAMSULOSIN HYDROCHLORIDE 0.4 MG/1
FLOMAX( 0.4MG ORAL  TWO TIMES DAILY ) ACTIVE -HX ENTRY CAPSULE ORAL
Status: ACTIVE | COMMUNITY
Start: 2021-08-09

## 2022-11-18 RX ORDER — ZOLPIDEM TARTRATE 10 MG/1
AMBIEN( 10MG ORAL   ) ACTIVE -HX ENTRY TABLET, FILM COATED ORAL
Status: ACTIVE | COMMUNITY
Start: 2021-08-09

## 2022-11-18 RX ORDER — DIGOXIN 0.05 MG/ML
DIGOXIN( 0.05MG/ML ORAL   ) ACTIVE -HX ENTRY SOLUTION ORAL
Status: ACTIVE | COMMUNITY
Start: 2021-08-09

## 2022-11-18 RX ORDER — PANTOPRAZOLE SODIUM 40 MG/1
TAKE ONE TABLET BY MOUTH EVERY 12 HOURS TABLET, DELAYED RELEASE ORAL
Qty: 180 TABLET | Refills: 3 | Status: ACTIVE | COMMUNITY

## 2022-11-18 RX ORDER — HYDRALAZINE HYDROCHLORIDE 25 MG/1
HYDRALAZINE HCL( 25MG ORAL   ) ACTIVE -HX ENTRY TABLET ORAL
Status: ACTIVE | COMMUNITY
Start: 2021-08-09

## 2022-11-18 RX ORDER — PANTOPRAZOLE SODIUM 40 MG/1
PROTONIX( 40MG ORAL   ) ACTIVE -HX ENTRY TABLET, DELAYED RELEASE ORAL
Status: ACTIVE | COMMUNITY
Start: 2021-08-09

## 2022-11-18 RX ORDER — TORSEMIDE 20 MG/1
TORSEMIDE( 20MG ORAL   ) ACTIVE -HX ENTRY TABLET ORAL
Status: ACTIVE | COMMUNITY
Start: 2021-08-09

## 2022-11-18 RX ORDER — INSULIN GLARGINE 100 [IU]/ML
LANTUS SOLOSTAR( 100UNIT/ML SUBCUTANEOUS 45 UNITS  ) ACTIVE -HX ENTRY INJECTION, SOLUTION SUBCUTANEOUS
Status: ACTIVE | COMMUNITY
Start: 2021-08-09

## 2022-11-18 RX ORDER — HYDROCODONE/ACETAMINOPHEN 10MG-325MG
NORCO( 10-325MG ORAL   ) ACTIVE -HX ENTRY TABLET ORAL
Status: ACTIVE | COMMUNITY
Start: 2021-08-09

## 2022-11-18 NOTE — HPI-MIGRATED HPI
Transition of Care : Patient evaluated after having EGD found with mild gastritis. Also FibroScan was performed due to concerns of Cirrhosis but was negative for advance Fibrosis. REferred having progressive weight loss that started around April 2022.  Denies nausea, vomits, dysphagia, odynophagia, heartburn, abdominal pain, diarrhea, constipation GI bleeding

## 2022-12-04 ENCOUNTER — TELEPHONE ENCOUNTER (OUTPATIENT)
Dept: URBAN - METROPOLITAN AREA CLINIC 68 | Facility: CLINIC | Age: 72
End: 2022-12-04

## 2022-12-13 ENCOUNTER — OFFICE VISIT (OUTPATIENT)
Dept: URBAN - METROPOLITAN AREA CLINIC 66 | Facility: CLINIC | Age: 72
End: 2022-12-13

## 2022-12-13 RX ORDER — CARVEDILOL 25 MG/1
COREG( 25MG ORAL  TWO TIMES DAILY ) ACTIVE -HX ENTRY TABLET, FILM COATED ORAL
Status: ACTIVE | COMMUNITY
Start: 2021-08-09

## 2022-12-13 RX ORDER — HYDRALAZINE HYDROCHLORIDE 25 MG/1
HYDRALAZINE HCL( 25MG ORAL   ) ACTIVE -HX ENTRY TABLET ORAL
Status: ACTIVE | COMMUNITY
Start: 2021-08-09

## 2022-12-13 RX ORDER — FLUTICASONE PROPIONATE AND SALMETEROL XINAFOATE 45; 21 UG/1; UG/1
ADVAIR HFA( 45-21MCG/ACT INHALATION   ) ACTIVE -HX ENTRY AEROSOL, METERED RESPIRATORY (INHALATION)
Status: ACTIVE | COMMUNITY
Start: 2021-08-09

## 2022-12-13 RX ORDER — HYDROCODONE/ACETAMINOPHEN 10MG-325MG
NORCO( 10-325MG ORAL   ) ACTIVE -HX ENTRY TABLET ORAL
Status: ACTIVE | COMMUNITY
Start: 2021-08-09

## 2022-12-13 RX ORDER — ZOLPIDEM TARTRATE 10 MG/1
AMBIEN( 10MG ORAL   ) ACTIVE -HX ENTRY TABLET, FILM COATED ORAL
Status: ACTIVE | COMMUNITY
Start: 2021-08-09

## 2022-12-13 RX ORDER — TRAZODONE HYDROCHLORIDE 150 MG/1
TRAZODONE HCL( 150MG ORAL   ) ACTIVE -HX ENTRY TABLET ORAL
Status: ACTIVE | COMMUNITY
Start: 2021-08-09

## 2022-12-13 RX ORDER — DIGOXIN 0.05 MG/ML
DIGOXIN( 0.05MG/ML ORAL   ) ACTIVE -HX ENTRY SOLUTION ORAL
Status: ACTIVE | COMMUNITY
Start: 2021-08-09

## 2022-12-13 RX ORDER — LIRAGLUTIDE 6 MG/ML
VICTOZA( 18MG/3ML SUBCUTANEOUS   ) ACTIVE -HX ENTRY INJECTION SUBCUTANEOUS
Status: ACTIVE | COMMUNITY
Start: 2021-08-09

## 2022-12-13 RX ORDER — VALSARTAN 40 MG/1
VALSARTAN( 40MG ORAL   ) ACTIVE -HX ENTRY TABLET ORAL
Status: ACTIVE | COMMUNITY
Start: 2021-08-09

## 2022-12-13 RX ORDER — PANTOPRAZOLE SODIUM 40 MG/1
PROTONIX( 40MG ORAL   ) ACTIVE -HX ENTRY TABLET, DELAYED RELEASE ORAL
Status: ACTIVE | COMMUNITY
Start: 2021-08-09

## 2022-12-13 RX ORDER — INSULIN ASPART 100 [IU]/ML
NOVOLOG FLEXPEN( 100UNIT/ML SUBCUTANEOUS   ) ACTIVE -HX ENTRY INJECTION, SOLUTION INTRAVENOUS; SUBCUTANEOUS
Status: ACTIVE | COMMUNITY
Start: 2021-08-09

## 2022-12-13 RX ORDER — OXYCODONE HYDROCHLORIDE 40 MG/1
OXYCONTIN( 40MG ORAL   ) ACTIVE -HX ENTRY TABLET, FILM COATED, EXTENDED RELEASE ORAL
Status: ACTIVE | COMMUNITY
Start: 2021-08-09

## 2022-12-13 RX ORDER — LEVOCETIRIZINE DIHYDROCHLORIDE 5 MG/1
LEVOCETIRIZINE DIHYDROCHLORIDE( 5MG ORAL   ) ACTIVE -HX ENTRY TABLET, FILM COATED ORAL
Status: ACTIVE | COMMUNITY
Start: 2021-08-09

## 2022-12-13 RX ORDER — MONTELUKAST SODIUM 10 MG/1
MONTELUKAST SODIUM( 10MG ORAL   ) ACTIVE -HX ENTRY TABLET, FILM COATED ORAL
Status: ACTIVE | COMMUNITY
Start: 2021-08-09

## 2022-12-13 RX ORDER — TAMSULOSIN HYDROCHLORIDE 0.4 MG/1
FLOMAX( 0.4MG ORAL  TWO TIMES DAILY ) ACTIVE -HX ENTRY CAPSULE ORAL
Status: ACTIVE | COMMUNITY
Start: 2021-08-09

## 2022-12-13 RX ORDER — PANTOPRAZOLE SODIUM 40 MG/1
TAKE ONE TABLET BY MOUTH EVERY 12 HOURS TABLET, DELAYED RELEASE ORAL
Qty: 180 TABLET | Refills: 3 | Status: ACTIVE | COMMUNITY

## 2022-12-13 RX ORDER — ELECTROLYTES/DEXTROSE
MULTIVITAMIN ADULT(  ORAL  DAILY ) ACTIVE -HX ENTRY SOLUTION, ORAL ORAL DAILY
Status: ACTIVE | COMMUNITY
Start: 2021-08-09

## 2022-12-13 RX ORDER — INSULIN GLARGINE 100 [IU]/ML
LANTUS SOLOSTAR( 100UNIT/ML SUBCUTANEOUS 45 UNITS  ) ACTIVE -HX ENTRY INJECTION, SOLUTION SUBCUTANEOUS
Status: ACTIVE | COMMUNITY
Start: 2021-08-09

## 2022-12-13 RX ORDER — MONTELUKAST SODIUM 10 MG/1
SINGULAIR( 10MG ORAL   ) ACTIVE -HX ENTRY TABLET, FILM COATED ORAL
Status: ACTIVE | COMMUNITY
Start: 2021-08-09

## 2022-12-13 RX ORDER — PRAVASTATIN SODIUM 40 MG/1
PRAVASTATIN SODIUM( 40MG ORAL  DAILY ) ACTIVE -HX ENTRY TABLET ORAL DAILY
Status: ACTIVE | COMMUNITY
Start: 2021-08-09

## 2022-12-13 RX ORDER — TORSEMIDE 20 MG/1
TORSEMIDE( 20MG ORAL   ) ACTIVE -HX ENTRY TABLET ORAL
Status: ACTIVE | COMMUNITY
Start: 2021-08-09

## 2022-12-13 NOTE — HPI-MIGRATED HPI
Transition of Care : Patient evaluated after having recent CT scan. CT did no showed any signs of suspected CIrrhosis, ALso had negative recent Fibroscan for cirrhosis.  Referred having dysphagia mostly with solids.  Denies nausea, vomits, odynophagia, heartburn, abdominal pain, diarrhea, constipation GI bleeding or weight loss

## 2022-12-13 NOTE — EXAM-MIGRATED EXAMINATIONS
General Examination: Extremities: -> normal extremity with no clubbing, cyanosis or edema   Neurologic: -> alert and oriented, normal exam with no motor or sensory deficits   Skin: -> skin is warm and dry, with no rashes, good skin turgor and normal hair distribution, with no suspicious skin lesions   Heart: -> regular rate and rhythm without murmurs, gallops, clicks or rubs   Lungs: -> clear to auscultation bilaterally, with good air movement and no rales, rhonchi or wheezes   Breasts: ->    Abdomen: -> soft with good bowel sounds, nontender, and no masses or hepatosplenomegaly   Rectal: -> not examined    Head: -> normocephalic, atraumatic   Eyes: -> pupils equal, round, reactive to light and accommodation, sclera anicteric   Ears: -> normal   Oral cavity: -> mucosa moist   Neck / thyroid: ->    Lymph nodes: ->    General appearance: -> alert, pleasant, well-nourished and in no acute distress

## 2023-03-02 ENCOUNTER — TELEPHONE ENCOUNTER (OUTPATIENT)
Dept: URBAN - METROPOLITAN AREA CLINIC 68 | Facility: CLINIC | Age: 73
End: 2023-03-02

## 2023-03-06 ENCOUNTER — DASHBOARD ENCOUNTERS (OUTPATIENT)
Age: 73
End: 2023-03-06

## 2023-03-06 ENCOUNTER — OFFICE VISIT (OUTPATIENT)
Dept: URBAN - METROPOLITAN AREA CLINIC 68 | Facility: CLINIC | Age: 73
End: 2023-03-06

## 2023-03-06 ENCOUNTER — TELEPHONE ENCOUNTER (OUTPATIENT)
Dept: URBAN - METROPOLITAN AREA CLINIC 68 | Facility: CLINIC | Age: 73
End: 2023-03-06

## 2023-03-06 RX ORDER — ELECTROLYTES/DEXTROSE
MULTIVITAMIN ADULT(  ORAL  DAILY ) ACTIVE -HX ENTRY SOLUTION, ORAL ORAL DAILY
Status: ACTIVE | COMMUNITY
Start: 2021-08-09

## 2023-03-06 RX ORDER — PANTOPRAZOLE SODIUM 40 MG/1
PROTONIX( 40MG ORAL   ) ACTIVE -HX ENTRY TABLET, DELAYED RELEASE ORAL
Status: ACTIVE | COMMUNITY
Start: 2021-08-09

## 2023-03-06 RX ORDER — MONTELUKAST SODIUM 10 MG/1
SINGULAIR( 10MG ORAL   ) ACTIVE -HX ENTRY TABLET, FILM COATED ORAL
Status: ACTIVE | COMMUNITY
Start: 2021-08-09

## 2023-03-06 RX ORDER — LEVOCETIRIZINE DIHYDROCHLORIDE 5 MG/1
LEVOCETIRIZINE DIHYDROCHLORIDE( 5MG ORAL   ) ACTIVE -HX ENTRY TABLET, FILM COATED ORAL
Status: ACTIVE | COMMUNITY
Start: 2021-08-09

## 2023-03-06 RX ORDER — LIRAGLUTIDE 6 MG/ML
VICTOZA( 18MG/3ML SUBCUTANEOUS   ) ACTIVE -HX ENTRY INJECTION SUBCUTANEOUS
Status: ACTIVE | COMMUNITY
Start: 2021-08-09

## 2023-03-06 RX ORDER — PANTOPRAZOLE SODIUM 40 MG/1
TAKE ONE TABLET BY MOUTH EVERY 12 HOURS TABLET, DELAYED RELEASE ORAL
Qty: 180 TABLET | Refills: 3 | Status: ACTIVE | COMMUNITY

## 2023-03-06 RX ORDER — PRAVASTATIN SODIUM 40 MG/1
PRAVASTATIN SODIUM( 40MG ORAL  DAILY ) ACTIVE -HX ENTRY TABLET ORAL DAILY
Status: ACTIVE | COMMUNITY
Start: 2021-08-09

## 2023-03-06 RX ORDER — FLUTICASONE PROPIONATE AND SALMETEROL XINAFOATE 45; 21 UG/1; UG/1
ADVAIR HFA( 45-21MCG/ACT INHALATION   ) ACTIVE -HX ENTRY AEROSOL, METERED RESPIRATORY (INHALATION)
Status: ACTIVE | COMMUNITY
Start: 2021-08-09

## 2023-03-06 RX ORDER — INSULIN ASPART 100 [IU]/ML
NOVOLOG FLEXPEN( 100UNIT/ML SUBCUTANEOUS   ) ACTIVE -HX ENTRY INJECTION, SOLUTION INTRAVENOUS; SUBCUTANEOUS
Status: ACTIVE | COMMUNITY
Start: 2021-08-09

## 2023-03-06 RX ORDER — HYDRALAZINE HYDROCHLORIDE 25 MG/1
HYDRALAZINE HCL( 25MG ORAL   ) ACTIVE -HX ENTRY TABLET ORAL
Status: ACTIVE | COMMUNITY
Start: 2021-08-09

## 2023-03-06 RX ORDER — VALSARTAN 40 MG/1
VALSARTAN( 40MG ORAL   ) ACTIVE -HX ENTRY TABLET ORAL
Status: ACTIVE | COMMUNITY
Start: 2021-08-09

## 2023-03-06 RX ORDER — TORSEMIDE 20 MG/1
TORSEMIDE( 20MG ORAL   ) ACTIVE -HX ENTRY TABLET ORAL
Status: ACTIVE | COMMUNITY
Start: 2021-08-09

## 2023-03-06 RX ORDER — HYDROCODONE/ACETAMINOPHEN 10MG-325MG
NORCO( 10-325MG ORAL   ) ACTIVE -HX ENTRY TABLET ORAL
Status: ACTIVE | COMMUNITY
Start: 2021-08-09

## 2023-03-06 RX ORDER — MONTELUKAST SODIUM 10 MG/1
MONTELUKAST SODIUM( 10MG ORAL   ) ACTIVE -HX ENTRY TABLET, FILM COATED ORAL
Status: ACTIVE | COMMUNITY
Start: 2021-08-09

## 2023-03-06 RX ORDER — INSULIN GLARGINE 100 [IU]/ML
LANTUS SOLOSTAR( 100UNIT/ML SUBCUTANEOUS 45 UNITS  ) ACTIVE -HX ENTRY INJECTION, SOLUTION SUBCUTANEOUS
Status: ACTIVE | COMMUNITY
Start: 2021-08-09

## 2023-03-06 RX ORDER — DIGOXIN 0.05 MG/ML
DIGOXIN( 0.05MG/ML ORAL   ) ACTIVE -HX ENTRY SOLUTION ORAL
Status: ACTIVE | COMMUNITY
Start: 2021-08-09

## 2023-03-06 RX ORDER — ZOLPIDEM TARTRATE 10 MG/1
AMBIEN( 10MG ORAL   ) ACTIVE -HX ENTRY TABLET, FILM COATED ORAL
Status: ACTIVE | COMMUNITY
Start: 2021-08-09

## 2023-03-06 RX ORDER — OXYCODONE HYDROCHLORIDE 40 MG/1
OXYCONTIN( 40MG ORAL   ) ACTIVE -HX ENTRY TABLET, FILM COATED, EXTENDED RELEASE ORAL
Status: ACTIVE | COMMUNITY
Start: 2021-08-09

## 2023-03-06 RX ORDER — CARVEDILOL 25 MG/1
COREG( 25MG ORAL  TWO TIMES DAILY ) ACTIVE -HX ENTRY TABLET, FILM COATED ORAL
Status: ACTIVE | COMMUNITY
Start: 2021-08-09

## 2023-03-06 RX ORDER — TAMSULOSIN HYDROCHLORIDE 0.4 MG/1
FLOMAX( 0.4MG ORAL  TWO TIMES DAILY ) ACTIVE -HX ENTRY CAPSULE ORAL
Status: ACTIVE | COMMUNITY
Start: 2021-08-09

## 2023-03-06 RX ORDER — TRAZODONE HYDROCHLORIDE 150 MG/1
TRAZODONE HCL( 150MG ORAL   ) ACTIVE -HX ENTRY TABLET ORAL
Status: ACTIVE | COMMUNITY
Start: 2021-08-09

## 2023-03-06 NOTE — HPI-MIGRATED HPI
Transition of Care : Patient evaluated due to dysphagia.  Had recent MBS reported with Laryngeal penetration with thin Barium.  Denies nausea, vomits, dysphagia, odynophagia, heartburn, abdominal pain, diarrhea, constipation GI bleeding or weight loss

## 2023-03-20 ENCOUNTER — TELEPHONE ENCOUNTER (OUTPATIENT)
Dept: URBAN - METROPOLITAN AREA CLINIC 68 | Facility: CLINIC | Age: 73
End: 2023-03-20

## 2023-08-21 ENCOUNTER — ERX REFILL RESPONSE (OUTPATIENT)
Dept: URBAN - METROPOLITAN AREA CLINIC 66 | Facility: CLINIC | Age: 73
End: 2023-08-21

## 2023-08-21 RX ORDER — PANTOPRAZOLE SODIUM 40 MG/1
TAKE ONE TABLET BY MOUTH EVERY 12 HOURS FOR 90 DAYS TABLET, DELAYED RELEASE ORAL
Qty: 180 TABLET | Refills: 4 | OUTPATIENT

## 2023-08-21 RX ORDER — PANTOPRAZOLE SODIUM 40 MG/1
TAKE ONE TABLET BY MOUTH EVERY 12 HOURS FOR 90 DAYS TABLET, DELAYED RELEASE ORAL
Qty: 180 TABLET | Refills: 3 | OUTPATIENT